# Patient Record
Sex: FEMALE | Race: OTHER | NOT HISPANIC OR LATINO
[De-identification: names, ages, dates, MRNs, and addresses within clinical notes are randomized per-mention and may not be internally consistent; named-entity substitution may affect disease eponyms.]

---

## 2020-01-07 ENCOUNTER — TRANSCRIPTION ENCOUNTER (OUTPATIENT)
Age: 45
End: 2020-01-07

## 2021-08-17 ENCOUNTER — FORM ENCOUNTER (OUTPATIENT)
Age: 46
End: 2021-08-17

## 2021-08-24 ENCOUNTER — TRANSCRIPTION ENCOUNTER (OUTPATIENT)
Age: 46
End: 2021-08-24

## 2021-09-07 ENCOUNTER — TRANSCRIPTION ENCOUNTER (OUTPATIENT)
Age: 46
End: 2021-09-07

## 2021-09-14 ENCOUNTER — TRANSCRIPTION ENCOUNTER (OUTPATIENT)
Age: 46
End: 2021-09-14

## 2021-09-19 ENCOUNTER — TRANSCRIPTION ENCOUNTER (OUTPATIENT)
Age: 46
End: 2021-09-19

## 2021-09-28 ENCOUNTER — TRANSCRIPTION ENCOUNTER (OUTPATIENT)
Age: 46
End: 2021-09-28

## 2021-10-04 ENCOUNTER — TRANSCRIPTION ENCOUNTER (OUTPATIENT)
Age: 46
End: 2021-10-04

## 2021-10-10 ENCOUNTER — TRANSCRIPTION ENCOUNTER (OUTPATIENT)
Age: 46
End: 2021-10-10

## 2021-10-20 ENCOUNTER — TRANSCRIPTION ENCOUNTER (OUTPATIENT)
Age: 46
End: 2021-10-20

## 2021-10-24 ENCOUNTER — TRANSCRIPTION ENCOUNTER (OUTPATIENT)
Age: 46
End: 2021-10-24

## 2021-11-01 ENCOUNTER — TRANSCRIPTION ENCOUNTER (OUTPATIENT)
Age: 46
End: 2021-11-01

## 2021-11-08 ENCOUNTER — TRANSCRIPTION ENCOUNTER (OUTPATIENT)
Age: 46
End: 2021-11-08

## 2021-11-14 ENCOUNTER — TRANSCRIPTION ENCOUNTER (OUTPATIENT)
Age: 46
End: 2021-11-14

## 2021-11-22 ENCOUNTER — TRANSCRIPTION ENCOUNTER (OUTPATIENT)
Age: 46
End: 2021-11-22

## 2021-12-01 ENCOUNTER — TRANSCRIPTION ENCOUNTER (OUTPATIENT)
Age: 46
End: 2021-12-01

## 2021-12-06 ENCOUNTER — TRANSCRIPTION ENCOUNTER (OUTPATIENT)
Age: 46
End: 2021-12-06

## 2021-12-13 ENCOUNTER — TRANSCRIPTION ENCOUNTER (OUTPATIENT)
Age: 46
End: 2021-12-13

## 2021-12-23 ENCOUNTER — TRANSCRIPTION ENCOUNTER (OUTPATIENT)
Age: 46
End: 2021-12-23

## 2022-01-11 ENCOUNTER — TRANSCRIPTION ENCOUNTER (OUTPATIENT)
Age: 47
End: 2022-01-11

## 2022-01-18 ENCOUNTER — TRANSCRIPTION ENCOUNTER (OUTPATIENT)
Age: 47
End: 2022-01-18

## 2022-01-24 ENCOUNTER — TRANSCRIPTION ENCOUNTER (OUTPATIENT)
Age: 47
End: 2022-01-24

## 2022-05-05 ENCOUNTER — NON-APPOINTMENT (OUTPATIENT)
Age: 47
End: 2022-05-05

## 2022-05-06 PROBLEM — Z00.00 ENCOUNTER FOR PREVENTIVE HEALTH EXAMINATION: Status: ACTIVE | Noted: 2022-05-06

## 2022-05-27 ENCOUNTER — APPOINTMENT (OUTPATIENT)
Dept: TRANSPLANT | Facility: CLINIC | Age: 47
End: 2022-05-27
Payer: SUBSIDIZED

## 2022-05-27 ENCOUNTER — APPOINTMENT (OUTPATIENT)
Dept: NEPHROLOGY | Facility: CLINIC | Age: 47
End: 2022-05-27
Payer: SUBSIDIZED

## 2022-05-27 ENCOUNTER — NON-APPOINTMENT (OUTPATIENT)
Age: 47
End: 2022-05-27

## 2022-05-27 VITALS
OXYGEN SATURATION: 100 % | HEIGHT: 64 IN | SYSTOLIC BLOOD PRESSURE: 113 MMHG | WEIGHT: 138 LBS | HEART RATE: 90 BPM | DIASTOLIC BLOOD PRESSURE: 78 MMHG | RESPIRATION RATE: 14 BRPM | BODY MASS INDEX: 23.56 KG/M2

## 2022-05-27 DIAGNOSIS — Z00.5 ENCOUNTER FOR EXAMINATION OF POTENTIAL DONOR OF ORGAN AND TISSUE: ICD-10-CM

## 2022-05-27 PROCEDURE — 99072 ADDL SUPL MATRL&STAF TM PHE: CPT

## 2022-05-27 PROCEDURE — 99205 OFFICE O/P NEW HI 60 MIN: CPT

## 2022-05-27 PROCEDURE — 99203 OFFICE O/P NEW LOW 30 MIN: CPT

## 2022-05-27 NOTE — PLAN
[FreeTextEntry1] : Pt is a good candidate for kidney donation.  She will need her colonoscopy first as she is > 45 and has IBS.  \par In addition will need to check hypercoaguable state as she has family history of DVT and PE.

## 2022-05-27 NOTE — HISTORY OF PRESENT ILLNESS
[Donor denies coercion and/or being paid to donate kidney] : Donor denies coercion and/or being paid to donate kidney [FreeTextEntry3] : 5/27/22 [FreeTextEntry5] : Pt is a 47yo female who would like to donate a kidney to her . \par \par PT denies any medical problems.  She follows w PCP Ant Villanueva for routine care. No h/o hospitalizations. She has a history of IBS and has upcoming colonoscopy in . \par 1 child no gestational DM or HTN.  Had hypercoaguable work up during pregnancy - ? heterozygous.  Mothers family has a history of hypercoaguable state.  \par \par Exercises frequently, kickboxing, no angina. Has herniated discs but do not bother her much. \par \par PMHx: as above\par PSHx: , D&C x 2,\par \par Meds: serphin (ssri), takes NSAIDs during menstrual cycle. \par NKDA\par \par SocHx: Works at [employer] in [place]. Lives in [place]. No tob/social EtOH/no illicits\par FamHx: grandmother w DM, maternal h/o hypercoagulability - had PE's.  Maternal grandmother also with PE.  2 brothers 52 and 50 - healthy.

## 2022-05-27 NOTE — REASON FOR VISIT
Pt. has chronic risk , however currently she is adherent to meds, no sub abuse, she attends groups. she denied any SI or plans. [Initial Evaluation] :  an initial donor evaluation

## 2022-05-27 NOTE — HISTORY OF PRESENT ILLNESS
[Donor denies coercion and/or being paid to donate kidney] : Donor denies coercion and/or being paid to donate kidney [FreeTextEntry3] : 5/27/2022 [FreeTextEntry4] : wife [FreeTextEntry5] : 47yo F presents as potential kidney donor to . \par \par Ms Farmer has no significant medical history.  She follows w PCP Ant Villanueva for routine care.  No h/o hospitalizations.  Does carry diagnosis of IBS and has upcoming colonoscopy.   She denies hematuria, renal calculi, DM, hyperglycemia, HTN. \par \par Exercises frequently, no angina.  Tolerates 4 METs without dyspnea.  \par \par PMHx: as above\par PSHx: , D&C x 2\par \par Meds: serphin\par NKDA\par \par SocHx: Works at [employer] in [place].  Lives in [place].  No tob/social EtOH/no illicits\par FamHx: grandmother w DM, maternal h/o hypercoagulability

## 2022-05-27 NOTE — PHYSICAL EXAM
[Well Developed] : well developed [Clear to Auscultation] : clear to auscultation [Breathing Comfortably on RA] : breathing comfortably on room air [Normal Rate] : normal rate [Soft] : soft [Non-tender] : non-tender [Alert] : alert [Responds to Questions Appropriately] : responds to questions appropriately [Oriented] : oriented [Appropriate] : appropriate

## 2022-05-27 NOTE — PHYSICAL EXAM
[General Appearance - Alert] : alert [General Appearance - In No Acute Distress] : in no acute distress [General Appearance - Well Nourished] : well nourished [General Appearance - Well Developed] : well developed [Sclera] : the sclera and conjunctiva were normal [Extraocular Movements] : extraocular movements were intact [Strabismus] : no strabismus was seen [Outer Ear] : the ears and nose were normal in appearance [Hearing Threshold Finger Rub Not Story] : hearing was normal [Nasal Cavity] : the nasal mucosa and septum were normal [Neck Appearance] : the appearance of the neck was normal [Neck Cervical Mass (___cm)] : no neck mass was observed [Jugular Venous Distention Increased] : there was no jugular-venous distention [Respiration, Rhythm And Depth] : normal respiratory rhythm and effort [Exaggerated Use Of Accessory Muscles For Inspiration] : no accessory muscle use [Auscultation Breath Sounds / Voice Sounds] : lungs were clear to auscultation bilaterally [Heart Rate And Rhythm] : heart rate was normal and rhythm regular [Heart Sounds] : normal S1 and S2 [Heart Sounds Gallop] : no gallops [Murmurs] : no murmurs [Full Pulse] : the pedal pulses are present [Edema] : there was no peripheral edema [Bowel Sounds] : normal bowel sounds [Abdomen Soft] : soft [Abdomen Tenderness] : non-tender [Cervical Lymph Nodes Enlarged Posterior Bilaterally] : posterior cervical [Cervical Lymph Nodes Enlarged Anterior Bilaterally] : anterior cervical [Supraclavicular Lymph Nodes Enlarged Bilaterally] : supraclavicular [Abnormal Walk] : normal gait [Nail Clubbing] : no clubbing  or cyanosis of the fingernails [Musculoskeletal - Swelling] : no joint swelling seen [Skin Color & Pigmentation] : normal skin color and pigmentation [Skin Turgor] : normal skin turgor [] : no rash [Cranial Nerves] : cranial nerves 2-12 were intact [Sensation] : the sensory exam was normal to light touch and pinprick [No Focal Deficits] : no focal deficits [Oriented To Time, Place, And Person] : oriented to person, place, and time [Impaired Insight] : insight and judgment were intact [Affect] : the affect was normal

## 2022-05-27 NOTE — REASON FOR VISIT
[Initial Evaluation] :  an initial donor evaluation range of motion is not limited and there is no muscle tenderness.

## 2022-05-31 ENCOUNTER — NON-APPOINTMENT (OUTPATIENT)
Age: 47
End: 2022-05-31

## 2022-06-07 LAB
ABO + RH PNL BLD: NORMAL
ALBUMIN SERPL ELPH-MCNC: 4.9 G/DL
ALP BLD-CCNC: 131 U/L
ALT SERPL-CCNC: 21 U/L
ANION GAP SERPL CALC-SCNC: 13 MMOL/L
APPEARANCE: CLEAR
APTT BLD: 29.7 SEC
AST SERPL-CCNC: 16 U/L
BACTERIA: NEGATIVE
BASOPHILS # BLD AUTO: 0.03 K/UL
BASOPHILS NFR BLD AUTO: 0.6 %
BILIRUB SERPL-MCNC: 0.2 MG/DL
BILIRUBIN URINE: NEGATIVE
BLOOD URINE: NEGATIVE
BUN SERPL-MCNC: 13 MG/DL
CALCIUM SERPL-MCNC: 9.6 MG/DL
CHLORIDE SERPL-SCNC: 102 MMOL/L
CHOLEST SERPL-MCNC: 198 MG/DL
CMV IGG SERPL QL: <0.2 U/ML
CMV IGG SERPL-IMP: NEGATIVE
CMV IGM SERPL QL: <8 AU/ML
CMV IGM SERPL QL: NEGATIVE
CO2 SERPL-SCNC: 24 MMOL/L
COLOR: NORMAL
COVID-19 NUCLEOCAPSID  GAM ANTIBODY INTERPRETATION: POSITIVE
COVID-19 SPIKE DOMAIN ANTIBODY INTERPRETATION: POSITIVE
CREAT SERPL-MCNC: 0.58 MG/DL
CREAT SPEC-SCNC: 104 MG/DL
CREAT SPEC-SCNC: 104 MG/DL
CREAT/PROT UR: 0.1 RATIO
CYSTATIN C SERPL-MCNC: 0.62 MG/L
EBV EA AB SER IA-ACNC: <5 U/ML
EBV EA AB TITR SER IF: POSITIVE
EBV EA IGG SER QL IA: 559 U/ML
EBV EA IGG SER-ACNC: NEGATIVE
EBV EA IGM SER IA-ACNC: NEGATIVE
EBV PATRN SPEC IB-IMP: NORMAL
EBV VCA IGG SER IA-ACNC: 562 U/ML
EBV VCA IGM SER QL IA: <10 U/ML
EGFR: 112 ML/MIN/1.73M2
EOSINOPHIL # BLD AUTO: 0.11 K/UL
EOSINOPHIL NFR BLD AUTO: 2.1 %
EPSTEIN-BARR VIRUS CAPSID ANTIGEN IGG: POSITIVE
ESTIMATED AVERAGE GLUCOSE: 108 MG/DL
GFR/BSA.PRED SERPLBLD CYS-BASED-ARV: 116 ML/MIN/1.73M2
GLUCOSE QUALITATIVE U: NEGATIVE
GLUCOSE SERPL-MCNC: 85 MG/DL
HBA1C MFR BLD HPLC: 5.4 %
HBV CORE IGG+IGM SER QL: NONREACTIVE
HBV CORE IGM SER QL: NONREACTIVE
HBV E AB SER QL: NONREACTIVE
HBV E AG SER QL: NONREACTIVE
HBV SURFACE AB SER QL: NONREACTIVE
HBV SURFACE AG SER QL: NONREACTIVE
HCG SERPL QL: NEGATIVE
HCT VFR BLD CALC: 40.2 %
HCV AB SER QL: NONREACTIVE
HCV S/CO RATIO: 0.09 S/CO
HDLC SERPL-MCNC: 65 MG/DL
HEPATITIS A IGG ANTIBODY: NONREACTIVE
HGB BLD-MCNC: 12.8 G/DL
HIV1+2 AB SPEC QL IA.RAPID: NONREACTIVE
HSV 1+2 IGG SER IA-IMP: NEGATIVE
HSV 1+2 IGG SER IA-IMP: POSITIVE
HSV1 IGG SER QL: 38.6 INDEX
HSV1 IGM SER QL: NEGATIVE
HSV2 AB FLD-ACNC: NEGATIVE
HSV2 IGG SER QL: 0.09 INDEX
HYALINE CASTS: 1 /LPF
IMM GRANULOCYTES NFR BLD AUTO: 0.2 %
INR PPP: 1.01 RATIO
KETONES URINE: NEGATIVE
LDLC SERPL CALC-MCNC: 121 MG/DL
LEUKOCYTE ESTERASE URINE: NEGATIVE
LYMPHOCYTES # BLD AUTO: 1.5 K/UL
LYMPHOCYTES NFR BLD AUTO: 29 %
MAN DIFF?: NORMAL
MCHC RBC-ENTMCNC: 24.2 PG
MCHC RBC-ENTMCNC: 31.8 GM/DL
MCV RBC AUTO: 76 FL
MEV IGG FLD QL IA: >300 AU/ML
MEV IGG+IGM SER-IMP: POSITIVE
MEV IGM SER QL: 2 ISR
MICROALBUMIN 24H UR DL<=1MG/L-MCNC: <1.2 MG/DL
MICROALBUMIN/CREAT 24H UR-RTO: NORMAL MG/G
MICROSCOPIC-UA: NORMAL
MONOCYTES # BLD AUTO: 0.39 K/UL
MONOCYTES NFR BLD AUTO: 7.5 %
NEUTROPHILS # BLD AUTO: 3.14 K/UL
NEUTROPHILS NFR BLD AUTO: 60.6 %
NITRITE URINE: NEGATIVE
NONHDLC SERPL-MCNC: 133 MG/DL
PAPP-A SERPL-ACNC: <1 MIU/ML
PH URINE: 6
PHOSPHATE SERPL-MCNC: 3.5 MG/DL
PLATELET # BLD AUTO: 258 K/UL
POTASSIUM SERPL-SCNC: 4 MMOL/L
PROT SERPL-MCNC: 7.4 G/DL
PROT UR-MCNC: 8 MG/DL
PROTEIN URINE: NEGATIVE
PT BLD: 11.7 SEC
RBC # BLD: 5.29 M/UL
RBC # FLD: 14.2 %
RED BLOOD CELLS URINE: 2 /HPF
RUBV IGG FLD-ACNC: 1.1 INDEX
RUBV IGG SER-IMP: POSITIVE
SARS-COV-2 AB SERPL IA-ACNC: >250 U/ML
SARS-COV-2 AB SERPL QL IA: 3.85 INDEX
SARS-COV-2 N GENE NPH QL NAA+PROBE: NOT DETECTED
SODIUM SERPL-SCNC: 139 MMOL/L
SPECIFIC GRAVITY URINE: 1.02
SQUAMOUS EPITHELIAL CELLS: 0 /HPF
STRONGYLOIDES AB SER IA-ACNC: NEGATIVE
T GONDII AB SER-IMP: NEGATIVE
T GONDII AB SER-IMP: NEGATIVE
T GONDII IGG SER QL: <3 IU/ML
T GONDII IGM SER QL: <3 AU/ML
T PALLIDUM AB SER QL IA: NEGATIVE
TRIGL SERPL-MCNC: 57 MG/DL
TSH SERPL-ACNC: 0.9 UIU/ML
UROBILINOGEN URINE: NORMAL
VZV AB TITR SER: POSITIVE
VZV IGG SER IF-ACNC: 2913 INDEX
VZV IGM SER IF-ACNC: <0.91 INDEX
WBC # FLD AUTO: 5.18 K/UL
WHITE BLOOD CELLS URINE: 0 /HPF

## 2022-06-20 ENCOUNTER — OUTPATIENT (OUTPATIENT)
Dept: OUTPATIENT SERVICES | Facility: HOSPITAL | Age: 47
LOS: 1 days | End: 2022-06-20
Payer: SUBSIDIZED

## 2022-06-20 ENCOUNTER — APPOINTMENT (OUTPATIENT)
Dept: RADIOLOGY | Facility: CLINIC | Age: 47
End: 2022-06-20
Payer: SUBSIDIZED

## 2022-06-20 ENCOUNTER — APPOINTMENT (OUTPATIENT)
Dept: CT IMAGING | Facility: CLINIC | Age: 47
End: 2022-06-20
Payer: SUBSIDIZED

## 2022-06-20 DIAGNOSIS — Z00.5 ENCOUNTER FOR EXAMINATION OF POTENTIAL DONOR OF ORGAN AND TISSUE: ICD-10-CM

## 2022-06-20 PROCEDURE — 71045 X-RAY EXAM CHEST 1 VIEW: CPT | Mod: 26

## 2022-06-20 PROCEDURE — 74174 CTA ABD&PLVS W/CONTRAST: CPT | Mod: 26

## 2022-06-20 PROCEDURE — 74174 CTA ABD&PLVS W/CONTRAST: CPT

## 2022-06-20 PROCEDURE — 71045 X-RAY EXAM CHEST 1 VIEW: CPT

## 2022-06-21 DIAGNOSIS — L29.9 PRURITUS, UNSPECIFIED: ICD-10-CM

## 2022-08-17 LAB
APCR PPP: 2.73 RATIO
APPEARANCE: CLEAR
APTT BLD: 31.6 SEC
AT III PPP CHRO-ACNC: 98 %
BACTERIA: NEGATIVE
BILIRUBIN URINE: NEGATIVE
BLOOD URINE: NEGATIVE
BSA DERIVED: 1.73 M2
CARDIOLIPIN AB SER IA-ACNC: NEGATIVE
CHOLEST SERPL-MCNC: 171 MG/DL
COLOR: COLORLESS
CONFIRM: 27.9 SEC
CREAT 24H UR-MCNC: 1.1 G/24 H
CREAT ?TM UR-MCNC: 45 MG/DL
CREAT CL 24H UR+SERPL-VRATE: 125 ML/MIN
CREAT SPEC-SCNC: 43 MG/DL
CREAT/PROT UR: 0.1 RATIO
DNA PLOIDY SPEC FC-IMP: NORMAL
DRVVT IMM 1:2 NP PPP: NORMAL
DRVVT SCREEN TO CONFIRM RATIO: 1.02 RATIO
FACT VIII ACT/NOR PPP: 108 %
FIBRINOGEN PPP COAG.DERIVED-MCNC: 387 MG/DL
FVL BLANK: 34.4
FVL TEST: 93.8
GLUCOSE QUALITATIVE U: NEGATIVE
HDLC SERPL-MCNC: 65 MG/DL
HYALINE CASTS: 0 /LPF
INR PPP: 0.98 RATIO
KETONES URINE: NEGATIVE
LDLC SERPL CALC-MCNC: 98 MG/DL
LEUKOCYTE ESTERASE URINE: NEGATIVE
MICROALBUMIN 24H UR DL<=1MG/L-MCNC: <1.2 MG/DL
MICROALBUMIN 24H UR DL<=1MG/L-MRATE: NORMAL MG/24HR
MICROALBUMIN ?TM UR-MRATE: NORMAL UG/MIN
MICROSCOPIC-UA: NORMAL
NITRITE URINE: NEGATIVE
NONHDLC SERPL-MCNC: 106 MG/DL
PH URINE: 7.5
PROT 24H UR-MRATE: 5 MG/DL
PROT ?TM UR-MCNC: 24 HR
PROT S AG ACT/NOR PPP IA: 79 %
PROT UR-MCNC: 120 MG/24 H
PROT UR-MCNC: 5 MG/DL
PROTEIN URINE: NEGATIVE
PT BLD: 11.6 SEC
PTR INTERP: NORMAL
RED BLOOD CELLS URINE: 3 /HPF
SCREEN DRVVT: 33.8 SEC
SILICA CLOTTING TIME INTERPRETATION: NORMAL
SILICA CLOTTING TIME S/C: 0.96 RATIO
SPECIFIC GRAVITY URINE: 1.01
SPECIMEN VOL 24H UR: 2400 ML
SQUAMOUS EPITHELIAL CELLS: 2 /HPF
TRIGL SERPL-MCNC: 44 MG/DL
TT CONT PPP: 23.7 SEC
UROBILINOGEN URINE: NORMAL
WHITE BLOOD CELLS URINE: 0 /HPF

## 2022-10-06 ENCOUNTER — NON-APPOINTMENT (OUTPATIENT)
Age: 47
End: 2022-10-06

## 2022-11-10 ENCOUNTER — NON-APPOINTMENT (OUTPATIENT)
Age: 47
End: 2022-11-10

## 2022-11-16 ENCOUNTER — APPOINTMENT (OUTPATIENT)
Dept: OBGYN | Facility: CLINIC | Age: 47
End: 2022-11-16

## 2022-11-30 ENCOUNTER — APPOINTMENT (OUTPATIENT)
Dept: OBGYN | Facility: CLINIC | Age: 47
End: 2022-11-30

## 2022-11-30 PROCEDURE — 99213 OFFICE O/P EST LOW 20 MIN: CPT

## 2022-11-30 PROCEDURE — 76857 US EXAM PELVIC LIMITED: CPT

## 2022-11-30 PROCEDURE — 76830 TRANSVAGINAL US NON-OB: CPT

## 2022-12-02 NOTE — PROCEDURE
[Pelvic Pain] : pelvic pain [Abnormal Uterine Bleeding] : abnormal uterine bleeding [Transvaginal Ultrasound] : transvaginal ultrasound

## 2022-12-07 ENCOUNTER — NON-APPOINTMENT (OUTPATIENT)
Age: 47
End: 2022-12-07

## 2022-12-07 ENCOUNTER — OUTPATIENT (OUTPATIENT)
Dept: OUTPATIENT SERVICES | Facility: HOSPITAL | Age: 47
LOS: 1 days | End: 2022-12-07
Payer: SUBSIDIZED

## 2022-12-07 ENCOUNTER — APPOINTMENT (OUTPATIENT)
Dept: PSYCHIATRY | Facility: HOSPITAL | Age: 47
End: 2022-12-07

## 2022-12-07 DIAGNOSIS — F41.9 ANXIETY DISORDER, UNSPECIFIED: ICD-10-CM

## 2022-12-07 PROCEDURE — 90792 PSYCH DIAG EVAL W/MED SRVCS: CPT

## 2022-12-23 ENCOUNTER — APPOINTMENT (OUTPATIENT)
Dept: OBGYN | Facility: CLINIC | Age: 47
End: 2022-12-23

## 2022-12-23 VITALS
WEIGHT: 138 LBS | DIASTOLIC BLOOD PRESSURE: 60 MMHG | OXYGEN SATURATION: 98 % | HEIGHT: 64 IN | RESPIRATION RATE: 16 BRPM | SYSTOLIC BLOOD PRESSURE: 120 MMHG | BODY MASS INDEX: 23.56 KG/M2 | HEART RATE: 87 BPM

## 2022-12-23 DIAGNOSIS — B00.1 HERPESVIRAL VESICULAR DERMATITIS: ICD-10-CM

## 2022-12-23 DIAGNOSIS — Z82.49 FAMILY HISTORY OF ISCHEMIC HEART DISEASE AND OTHER DISEASES OF THE CIRCULATORY SYSTEM: ICD-10-CM

## 2022-12-23 LAB
BILIRUB UR QL STRIP: NORMAL
CLARITY UR: CLEAR
COLLECTION METHOD: NORMAL
GLUCOSE UR-MCNC: NORMAL
HCG UR QL: 0.2 EU/DL
HCG UR QL: NEGATIVE
HGB UR QL STRIP.AUTO: NORMAL
KETONES UR-MCNC: NORMAL
LEUKOCYTE ESTERASE UR QL STRIP: NORMAL
NITRITE UR QL STRIP: NORMAL
PH UR STRIP: 5.5
PROT UR STRIP-MCNC: NORMAL
QUALITY CONTROL: YES
SP GR UR STRIP: 1.03

## 2022-12-23 PROCEDURE — 99214 OFFICE O/P EST MOD 30 MIN: CPT

## 2022-12-23 PROCEDURE — 81003 URINALYSIS AUTO W/O SCOPE: CPT | Mod: QW

## 2022-12-23 PROCEDURE — 81025 URINE PREGNANCY TEST: CPT

## 2022-12-23 NOTE — CHIEF COMPLAINT
[FreeTextEntry1] : pt found lump in breast. pt would like breast check.\par The patient also wishes to discuss her fibroid uterus and her recurrent episodes of menorrhagia.

## 2023-01-12 ENCOUNTER — NON-APPOINTMENT (OUTPATIENT)
Age: 48
End: 2023-01-12

## 2023-01-12 ENCOUNTER — APPOINTMENT (OUTPATIENT)
Dept: OTOLARYNGOLOGY | Facility: CLINIC | Age: 48
End: 2023-01-12
Payer: COMMERCIAL

## 2023-01-12 VITALS
HEIGHT: 67 IN | DIASTOLIC BLOOD PRESSURE: 75 MMHG | BODY MASS INDEX: 21.5 KG/M2 | HEART RATE: 64 BPM | SYSTOLIC BLOOD PRESSURE: 110 MMHG | WEIGHT: 137 LBS

## 2023-01-12 DIAGNOSIS — H61.20 IMPACTED CERUMEN, UNSPECIFIED EAR: ICD-10-CM

## 2023-01-12 DIAGNOSIS — R42 DIZZINESS AND GIDDINESS: ICD-10-CM

## 2023-01-12 PROCEDURE — 92567 TYMPANOMETRY: CPT

## 2023-01-12 PROCEDURE — 92557 COMPREHENSIVE HEARING TEST: CPT

## 2023-01-12 PROCEDURE — 99204 OFFICE O/P NEW MOD 45 MIN: CPT

## 2023-01-12 NOTE — DATA REVIEWED
[de-identified] : Type A tymps AU\par Hrg is WNL, 250-8000 Hz, AU\par REC: ENT f/u, re-eval per MD, further testing per MD

## 2023-01-12 NOTE — REVIEW OF SYSTEMS
[Sneezing] : sneezing [Seasonal Allergies] : seasonal allergies [Ear Pain] : ear pain [Hearing Loss] : hearing loss [Vertigo] : vertigo [Nasal Congestion] : nasal congestion [Negative] : Heme/Lymph [Patient Intake Form Reviewed] : Patient intake form was reviewed

## 2023-01-12 NOTE — ASSESSMENT
[FreeTextEntry1] :  wnl\par AVOID Q TIPS \par up to date exam with pmd\par hormone check and anemia discussed\par F/U AFTER ABOVE

## 2023-01-12 NOTE — HISTORY OF PRESENT ILLNESS
[de-identified] : EARS FEEL CLOGGED AT TIMES\par MEDICAL HX REVIEWED\par RECENT TRAVEL TO [place]\par sometime lightheaded

## 2023-01-12 NOTE — PHYSICAL EXAM
[Normal] : mucosa is normal [Midline] : trachea located in midline position [de-identified] : LEFT IMPACTED CERUMEN REMOVED

## 2023-01-23 ENCOUNTER — APPOINTMENT (OUTPATIENT)
Dept: SURGERY | Facility: CLINIC | Age: 48
End: 2023-01-23
Payer: COMMERCIAL

## 2023-01-23 VITALS
HEIGHT: 67 IN | SYSTOLIC BLOOD PRESSURE: 114 MMHG | WEIGHT: 137 LBS | OXYGEN SATURATION: 96 % | TEMPERATURE: 97.6 F | DIASTOLIC BLOOD PRESSURE: 63 MMHG | HEART RATE: 81 BPM | BODY MASS INDEX: 21.5 KG/M2

## 2023-01-23 DIAGNOSIS — Z80.3 FAMILY HISTORY OF MALIGNANT NEOPLASM OF BREAST: ICD-10-CM

## 2023-01-23 DIAGNOSIS — Z78.9 OTHER SPECIFIED HEALTH STATUS: ICD-10-CM

## 2023-01-23 PROCEDURE — 99202 OFFICE O/P NEW SF 15 MIN: CPT

## 2023-01-23 NOTE — PAST MEDICAL HISTORY
[Menarche Age ____] : age at menarche was [unfilled] [Definite ___ (Date)] : the last menstrual period was [unfilled] [Regular Cycle Intervals] : have been regular [Total Preg ___] : G[unfilled] [Live Births ___] : P[unfilled]  [Full Term ___] : Full Term: [unfilled] [Age At Live Birth ___] : Age at live birth: [unfilled] [FreeTextEntry5] : D@C [FreeTextEntry6] : N/A [FreeTextEntry8] : N/A [FreeTextEntry7] : N/A

## 2023-01-23 NOTE — HISTORY OF PRESENT ILLNESS
[FreeTextEntry1] : 47 year old white female with a history of thickened left breast tissue c/o that she thinks it has increased in size. She denies any breast pain or nipple discharge.\par RISK FACTORS\par   FHX multiple aunts with breast cancer\par   1st period 11\par    1st child 38

## 2023-01-23 NOTE — REVIEW OF SYSTEMS
[Eye Pain] : no eye pain [Red Eyes] : eyes not red [Eyesight Problems] : eyesight problems [Discharge From Eyes] : no purulent discharge from the eyes [Eyes Itch] : no itching of the eyes [Dry Eyes] : no dryness of the eyes [Negative] : Heme/Lymph

## 2023-01-23 NOTE — PHYSICAL EXAM
[Normocephalic] : normocephalic [Atraumatic] : atraumatic [EOMI] : extra ocular movement intact [PERRL] : pupils equal, round and reactive to light [Sclera nonicteric] : sclera nonicteric [Supple] : supple [No Supraclavicular Adenopathy] : no supraclavicular adenopathy [No Cervical Adenopathy] : no cervical adenopathy [Clear to Auscultation Bilat] : clear to auscultation bilaterally [Normal Sinus Rhythm] : normal sinus rhythm [Examined in the supine and seated position] : examined in the supine and seated position [Symmetrical] : symmetrical [No dominant masses] : no dominant masses in right breast  [No Nipple Retraction] : no left nipple retraction [No Nipple Discharge] : no left nipple discharge [Breast Mass Right Breast ___cm] : no masses [___cm] : ~M [unfilled] ~Ucm upper outer quadrant mass [No Axillary Lymphadenopathy] : no left axillary lymphadenopathy [de-identified] : mass in the upper outer quadrant [de-identified] : well developed white female in no distress

## 2023-01-25 ENCOUNTER — APPOINTMENT (OUTPATIENT)
Dept: OBGYN | Facility: CLINIC | Age: 48
End: 2023-01-25
Payer: COMMERCIAL

## 2023-01-25 VITALS
SYSTOLIC BLOOD PRESSURE: 90 MMHG | HEART RATE: 81 BPM | HEIGHT: 67 IN | WEIGHT: 137 LBS | OXYGEN SATURATION: 99 % | BODY MASS INDEX: 21.5 KG/M2 | DIASTOLIC BLOOD PRESSURE: 60 MMHG | RESPIRATION RATE: 16 BRPM

## 2023-01-25 LAB
BILIRUB UR QL STRIP: NORMAL
CLARITY UR: CLEAR
COLLECTION METHOD: NORMAL
GLUCOSE UR-MCNC: NORMAL
HCG UR QL: 0.2 EU/DL
HCG UR QL: NEGATIVE
HGB UR QL STRIP.AUTO: NORMAL
KETONES UR-MCNC: NORMAL
LEUKOCYTE ESTERASE UR QL STRIP: NORMAL
NITRITE UR QL STRIP: NORMAL
PH UR STRIP: 5.5
PROT UR STRIP-MCNC: NORMAL
QUALITY CONTROL: YES
SP GR UR STRIP: 1.01

## 2023-01-25 PROCEDURE — 81025 URINE PREGNANCY TEST: CPT

## 2023-01-25 PROCEDURE — 81003 URINALYSIS AUTO W/O SCOPE: CPT | Mod: QW

## 2023-01-25 PROCEDURE — 58558Z: CUSTOM

## 2023-01-25 NOTE — PLAN
[FreeTextEntry1] : An endometrial biopsy was done with 4 passes.\par The office hysteroscopy revealed a normal uterine cavity\par The options of ablation, hysterectomy, or IUD was discussed with the PT as possible solutions. \par \par Risks, benefits and alternatives of IUD contraception were discussed, including risks of infertility, abnormal bleeding, uterine perforation, infection, pregnancy, and ectopic pregnancy.  \par IUD is usually inserted during or just after a period and may be accompanied by pelvic pain and cramps.\par IUD string should be checked by the patient monthly and patient is to return after the first period following insertion for pelvic examination.\par Mirena IUD is effective for 8 years, Kyleena for up to five years, Laurel for three years and ParaGard IUD effective for 10 yrs.\par \par \par \par A consent was read and signed by the PT prior to the procedure.\par \par \par CONSENT TO OPERATION, ANESTHESIA, SEDATION, ANALGESIA, SPECIAL TREATMENT, OR PROCEDURE BLOOD TRANFUSION CONSENT\par Date:________ Time________ AM/PM \par \par \par 1.        Permission: I hereby authorize  _________________ and his/ her associates or assistants of his/ her choice at (the “hospital”) to perform the following procedure(s):\par ________________________________________________________________________________________________________________________________________________________\par \par \par Including such as photographing, videotaping, televising, or other methods of visually recording the procedure(s) as may be purposeful for the advancement of medical knowledge and or education, with the understanding that my (the patient’s) identity will remain anonymous. \par \par \par 2.        Explanation of procedure(s), risk, benefits, and alternatives.\par \par \par Dr. _________________ has fully explained to me the nature and purpose of the procedure(s) and has also informed me of expected benefits and complications (from known causes), attendant discomforts, and the risk that may arise, as well as possible alternative methods of diagnosis and/or treatment to the proposed procedure(s), including no treatment. I have been given an opportunity to ask questions, and all my questions have been answered fully and satisfactorily. \par \par \par 3.        Anesthesia: I further understand that anesthetics, sedatives, or analgesics as may be considered necessary and the type will be explained to me by the anesthesiologist prior to the surgery. \par \par \par 4.        Specimens: Any organs or tissues surgically removed may vbe examined and retained by the hospital medical, scientific, or educational purposes, and such tissues or part may be disposed of in accordance with customary practices and applicable state laws and regulations. \par \par \par 5.        Understanding of this form: I confirm that I have read this form, fully and understand its contents, and that all blank spaces above have been completed prior to my signing. I acknowledge that no guarantees or assurances have been made to me concerning the results intended from the procedure(s) described above. \par \par \par _____________________                _________        _______________________________\par Patient/Agent/Relative/Guardian        Date           Print Name             Relationship\par \par \par _____________________                _________      ________________________________\par Patient/Agent/Relative/Guardian        Date         Print Name              Relationship\par \par \par \par I hereby certify that I have explained the nature, purpose, benefits, and risk of, and alternatives to the operations/procedure(s), have offered to explain any questions, and have fully answered all such questions. I believe that the patient/relative/guardian fully understands what I have explained and answered. In the event that I was not present when the patient signed this form, I understand that the form is only documentation that the informed consent process took place. I remain responsible for having obtained the consent from the patient\par \par \par Date: _________________              Physician:_________________________________\par \par \par                                      Attending Surgeon: _________________________\par

## 2023-01-25 NOTE — PROCEDURE
[Endometrial Biopsy] : Endometrial biopsy [Uterine Perforation] : uterine perforation [Pain] : pain [Negative] : negative pregnancy test [Betadine] : Betadine [None] : none [Specimen Collected] : collected [Anteverted] : anteverted [No Complications] : No complications [Hysteroscopy] : Hysteroscopy [Time out performed] : Pre-procedure time out performed.  Patient's name, date of birth and procedure confirmed. [Consent Obtained] : Consent obtained [Risks] : risks [Benefits] : benefits [Alternatives] : alternatives [Patient] : patient [Infection] : infection [Bleeding] : bleeding [Allergic Reaction] : allergic reaction [Sent to Pathology] : specimen was placed in buffered formalin and sent for pathology [Antibiotics given] : antibiotics given [Hemostasis obtained] : hemostasis obtained [Tolerated Well] : Patient tolerated the procedure well [Aftercare instructions/regstrictions given and follow-up scheduled] : Aftercare instructions/restrictions given and follow-up scheduled [Cytotec] : Cytotec [___ mL Injected] : [unfilled] ~UmL of lidocaine [Ring Forceps] : Ring forceps [Lidocaine___ mL] : [unfilled] ~UmL of lidocaine [flexible] : Using aseptic technique a hysteroscopy was performed using a flexible hysteroscope [de-identified] : fibroid uterus  [de-identified] : Normal uterine cavity, the anterior wall of the uterus was indented by submucous myoma.

## 2023-02-01 ENCOUNTER — NON-APPOINTMENT (OUTPATIENT)
Age: 48
End: 2023-02-01

## 2023-02-01 LAB — CORE LAB BIOPSY: NORMAL

## 2023-02-16 ENCOUNTER — APPOINTMENT (OUTPATIENT)
Dept: SURGERY | Facility: CLINIC | Age: 48
End: 2023-02-16
Payer: COMMERCIAL

## 2023-02-16 VITALS — TEMPERATURE: 97.6 F

## 2023-02-16 DIAGNOSIS — N63.21 UNSPECIFIED LUMP IN THE LEFT BREAST, UPPER OUTER QUADRANT: ICD-10-CM

## 2023-02-16 PROCEDURE — 99212 OFFICE O/P EST SF 10 MIN: CPT

## 2023-02-16 NOTE — PHYSICAL EXAM
[Normocephalic] : normocephalic [Atraumatic] : atraumatic [EOMI] : extra ocular movement intact [PERRL] : pupils equal, round and reactive to light [Sclera nonicteric] : sclera nonicteric [Supple] : supple [No Supraclavicular Adenopathy] : no supraclavicular adenopathy [No Cervical Adenopathy] : no cervical adenopathy [Clear to Auscultation Bilat] : clear to auscultation bilaterally [Normal Sinus Rhythm] : normal sinus rhythm [Examined in the supine and seated position] : examined in the supine and seated position [No dominant masses] : no dominant masses in right breast  [No Nipple Retraction] : no left nipple retraction [No Nipple Discharge] : no left nipple discharge [No Axillary Lymphadenopathy] : no left axillary lymphadenopathy

## 2023-02-16 NOTE — HISTORY OF PRESENT ILLNESS
[FreeTextEntry1] : 47 year old white female with a history of thickened left breast tissue c/o that she thinks it has increased in size. She denies any breast pain or nipple discharge.\par RISK FACTORS\par   FHX multiple aunts with breast cancer\par   1st period 11\par    1st child 38 \par 2/16/23 pt returns after her repeat mammo and sonogram, Denies any further changes.

## 2023-02-27 ENCOUNTER — NON-APPOINTMENT (OUTPATIENT)
Age: 48
End: 2023-02-27

## 2023-02-27 ENCOUNTER — RX RENEWAL (OUTPATIENT)
Age: 48
End: 2023-02-27

## 2023-06-01 ENCOUNTER — NON-APPOINTMENT (OUTPATIENT)
Age: 48
End: 2023-06-01

## 2023-06-01 DIAGNOSIS — D21.9 BENIGN NEOPLASM OF CONNECTIVE AND OTHER SOFT TISSUE, UNSPECIFIED: ICD-10-CM

## 2023-06-10 ENCOUNTER — APPOINTMENT (OUTPATIENT)
Dept: MRI IMAGING | Facility: CLINIC | Age: 48
End: 2023-06-10
Payer: COMMERCIAL

## 2023-06-10 ENCOUNTER — OUTPATIENT (OUTPATIENT)
Dept: OUTPATIENT SERVICES | Facility: HOSPITAL | Age: 48
LOS: 1 days | End: 2023-06-10
Payer: COMMERCIAL

## 2023-06-10 DIAGNOSIS — Z00.8 ENCOUNTER FOR OTHER GENERAL EXAMINATION: ICD-10-CM

## 2023-06-10 PROCEDURE — A9585: CPT

## 2023-06-10 PROCEDURE — 72197 MRI PELVIS W/O & W/DYE: CPT

## 2023-06-10 PROCEDURE — 72197 MRI PELVIS W/O & W/DYE: CPT | Mod: 26

## 2023-07-18 ENCOUNTER — NON-APPOINTMENT (OUTPATIENT)
Age: 48
End: 2023-07-18

## 2023-07-18 ENCOUNTER — APPOINTMENT (OUTPATIENT)
Dept: INTERVENTIONAL RADIOLOGY/VASCULAR | Facility: CLINIC | Age: 48
End: 2023-07-18
Payer: COMMERCIAL

## 2023-08-29 NOTE — HISTORY OF PRESENT ILLNESS
[FreeTextEntry1] : 48 years old female with hx of symptomatic uterine fibroids. Symptoms have been persistent for a while but recently got worse. Symptoms include: \par  Endometrial biopsy     - Secretory endometrium. - Endometrial polyp 01/25/23. \par \par Gyn\par \par Denies any recent SOB, CP, fever, chills, n/v/d.????\par \par

## 2023-08-31 ENCOUNTER — APPOINTMENT (OUTPATIENT)
Dept: INTERVENTIONAL RADIOLOGY/VASCULAR | Facility: CLINIC | Age: 48
End: 2023-08-31
Payer: COMMERCIAL

## 2023-08-31 VITALS — WEIGHT: 130 LBS | BODY MASS INDEX: 20.4 KG/M2 | HEIGHT: 67 IN

## 2023-08-31 DIAGNOSIS — Z78.9 OTHER SPECIFIED HEALTH STATUS: ICD-10-CM

## 2023-08-31 PROCEDURE — 99203 OFFICE O/P NEW LOW 30 MIN: CPT | Mod: 95

## 2023-08-31 RX ORDER — MISOPROSTOL 200 UG/1
200 TABLET ORAL
Qty: 2 | Refills: 0 | Status: COMPLETED | COMMUNITY
Start: 2023-01-11 | End: 2023-08-31

## 2023-08-31 RX ORDER — ACYCLOVIR 50 MG/G
5 CREAM TOPICAL
Qty: 1 | Refills: 0 | Status: COMPLETED | COMMUNITY
Start: 2022-12-23 | End: 2023-08-31

## 2023-08-31 RX ORDER — TRANEXAMIC ACID 650 MG/1
TABLET ORAL
Refills: 0 | Status: COMPLETED | COMMUNITY
End: 2023-08-31

## 2023-08-31 RX ORDER — HYDROXYZINE HYDROCHLORIDE 10 MG/1
10 TABLET ORAL AS DIRECTED
Qty: 30 | Refills: 0 | Status: COMPLETED | COMMUNITY
Start: 2022-06-21 | End: 2023-08-31

## 2023-08-31 RX ORDER — VALACYCLOVIR 1 G/1
1 TABLET, FILM COATED ORAL
Qty: 4 | Refills: 3 | Status: COMPLETED | COMMUNITY
Start: 2022-12-23 | End: 2023-08-31

## 2023-08-31 NOTE — REVIEW OF SYSTEMS
[Fever] : no fever [Chills] : no chills [Nosebleeds] : no nosebleeds [Sore Throat] : no sore throat [Hoarseness] : no hoarseness [Chest Pain] : no chest pain [Palpitations] : no palpitations [Shortness Of Breath] : no shortness of breath [Wheezing] : no wheezing [Cough] : no cough [Abdominal Pain] : no abdominal pain [Vomiting] : no vomiting [Constipation] : no constipation [Diarrhea] : no diarrhea [Easy Bleeding] : no tendency for easy bleeding [Easy Bruising] : no tendency for easy bruising

## 2023-08-31 NOTE — CONSULT LETTER
[Dear  ___] : Dear  [unfilled], [Consult Letter:] : I had the pleasure of evaluating your patient, [unfilled]. [Please see my note below.] : Please see my note below. [Consult Closing:] : Thank you very much for allowing me to participate in the care of this patient.  If you have any questions, please do not hesitate to contact me. [Sincerely,] : Sincerely, [FreeTextEntry2] : Dr. Abdalla [FreeTextEntry3] : Beau Tian MD, FSIR, FACR Interventional Radiologist   Executive  Department of Radiology- [facility]    Associate Professor of Radiology [employer] at [facility] [employer] [facility]

## 2023-08-31 NOTE — HISTORY OF PRESENT ILLNESS
[Menorrhagia] : ~T menorrhagia [Bloating] : bloating [Last Menstrual Period (___)] : Last menstrual period [unfilled] [Monthly Cycles Regular] : monthly cycles are regular [G ___] : G [unfilled] [P___] : P [unfilled] [M___] : M [unfilled] [A___] : A [unfilled] [FreeTextEntry1] : Patient is a 47 years old female with hx of symptomatic uterine fibroids. Symptoms have been persistent for a while but recently got worse. Symptoms include:  heavy bleeding,   Endometrial biopsy - Secretory endometrium. - Endometrial polyp 01/25/23. Gyn: Dr. Abdalla  Denies any recent SOB, CP, fever, chills, n/v/d.  [Plans for future pregnancies within 2 years] : does not plan to have future pregnancies within 2 years

## 2023-08-31 NOTE — REASON FOR VISIT
[Consultation] : a consultation visit [Home] : at home, [unfilled] , at the time of the visit. [Medical Office: ([facility])___] : at the medical office located in  [Patient] : the patient [Self] : self [FreeTextEntry1] : YUNIOR

## 2023-08-31 NOTE — PHYSICAL EXAM
[Alert] : alert [Well Nourished] : well nourished [Oriented x3] : oriented to person, place, and time [Normal Affect] : the affect was normal [Fully active, able to carry on all pre-disease performance without restriction] : Fully active, able to carry on all pre-disease performance without restriction

## 2023-08-31 NOTE — ASSESSMENT
[FreeTextEntry1] : This patient has adenomyosis which is the most likely etiology for her bleeding as she has no fibroids identified.. As you probably know, adenomyoticc tissue rarely infarcts following embolization and while bleeding often improves due to the diminished blood supply to the uterus, at several months to a year it often recurs as significantly as preprocedure.  I explained this to the patient and spoke with her about the fact that we generally only embolize adenomyosis to bridge people to menopause or through pregnancy and given her age, it is likely she will recur.  I encouraged her to meet with her gynecologist as the fact that she had adenomyosis rather than fibroids was not evident previously.  I did explain that I would be willing to do the procedure understanding that she is not unlikely to recur if she does not enter menopause in the next 2 years or so.  She stated she would likely hold off but will speak to her gynecologist and I furnished her with my contact information should she wish to discuss proceeding with the procedure.

## 2023-08-31 NOTE — DATA REVIEWED
[FreeTextEntry1] : MR pelvis reviewed with patient. All questions answered during consultation.  Mild adenomyosis without fibroids identified.

## 2023-10-13 ENCOUNTER — APPOINTMENT (OUTPATIENT)
Dept: OBGYN | Facility: CLINIC | Age: 48
End: 2023-10-13
Payer: COMMERCIAL

## 2023-10-13 DIAGNOSIS — N92.0 EXCESSIVE AND FREQUENT MENSTRUATION WITH REGULAR CYCLE: ICD-10-CM

## 2023-10-13 PROCEDURE — 76857 US EXAM PELVIC LIMITED: CPT

## 2023-10-13 PROCEDURE — 76830 TRANSVAGINAL US NON-OB: CPT

## 2023-10-16 PROBLEM — N92.0 MENORRHAGIA: Status: ACTIVE | Noted: 2022-11-14

## 2023-10-24 ENCOUNTER — APPOINTMENT (OUTPATIENT)
Dept: OBGYN | Facility: CLINIC | Age: 48
End: 2023-10-24
Payer: COMMERCIAL

## 2023-10-24 VITALS
HEIGHT: 67 IN | BODY MASS INDEX: 23.07 KG/M2 | SYSTOLIC BLOOD PRESSURE: 100 MMHG | DIASTOLIC BLOOD PRESSURE: 68 MMHG | OXYGEN SATURATION: 98 % | HEART RATE: 81 BPM | WEIGHT: 147 LBS

## 2023-10-24 DIAGNOSIS — Z12.39 ENCOUNTER FOR OTHER SCREENING FOR MALIGNANT NEOPLASM OF BREAST: ICD-10-CM

## 2023-10-24 DIAGNOSIS — Z12.4 ENCOUNTER FOR SCREENING FOR MALIGNANT NEOPLASM OF CERVIX: ICD-10-CM

## 2023-10-24 DIAGNOSIS — N80.03 ADENOMYOSIS OF THE UTERUS: ICD-10-CM

## 2023-10-24 DIAGNOSIS — Z11.3 ENCOUNTER FOR SCREENING FOR INFECTIONS WITH A PREDOMINANTLY SEXUAL MODE OF TRANSMISSION: ICD-10-CM

## 2023-10-24 DIAGNOSIS — D21.9 BENIGN NEOPLASM OF CONNECTIVE AND OTHER SOFT TISSUE, UNSPECIFIED: ICD-10-CM

## 2023-10-24 DIAGNOSIS — N64.4 MASTODYNIA: ICD-10-CM

## 2023-10-24 DIAGNOSIS — Z13.31 ENCOUNTER FOR SCREENING FOR DEPRESSION: ICD-10-CM

## 2023-10-24 DIAGNOSIS — N94.3 PREMENSTRUAL TENSION SYNDROME: ICD-10-CM

## 2023-10-24 DIAGNOSIS — Z01.411 ENCOUNTER FOR GYNECOLOGICAL EXAMINATION (GENERAL) (ROUTINE) WITH ABNORMAL FINDINGS: ICD-10-CM

## 2023-10-24 DIAGNOSIS — R14.0 ABDOMINAL DISTENSION (GASEOUS): ICD-10-CM

## 2023-10-24 DIAGNOSIS — Z01.419 ENCOUNTER FOR GYNECOLOGICAL EXAMINATION (GENERAL) (ROUTINE) W/OUT ABNORMAL FINDINGS: ICD-10-CM

## 2023-10-24 LAB
BILIRUB UR QL STRIP: NORMAL
CLARITY UR: CLEAR
COLLECTION METHOD: NORMAL
GLUCOSE UR-MCNC: NORMAL
HCG UR QL: 0.2 EU/DL
HCG UR QL: NEGATIVE
HGB UR QL STRIP.AUTO: NORMAL
KETONES UR-MCNC: NORMAL
LEUKOCYTE ESTERASE UR QL STRIP: NORMAL
NITRITE UR QL STRIP: NORMAL
PH UR STRIP: 6
PROT UR STRIP-MCNC: NORMAL
QUALITY CONTROL: YES
SP GR UR STRIP: 1.01

## 2023-10-24 PROCEDURE — 99396 PREV VISIT EST AGE 40-64: CPT

## 2023-10-24 PROCEDURE — 81003 URINALYSIS AUTO W/O SCOPE: CPT | Mod: QW

## 2023-10-24 PROCEDURE — 81025 URINE PREGNANCY TEST: CPT

## 2023-10-24 PROCEDURE — 82270 OCCULT BLOOD FECES: CPT

## 2023-10-24 RX ORDER — MAGNESIUM OXIDE/MAG AA CHELATE 300 MG
CAPSULE ORAL
Refills: 0 | Status: ACTIVE | COMMUNITY

## 2023-10-24 RX ORDER — FUROSEMIDE 20 MG/1
20 TABLET ORAL
Qty: 20 | Refills: 0 | Status: ACTIVE | COMMUNITY
Start: 2023-10-24 | End: 1900-01-01

## 2023-10-24 RX ORDER — SAW/PYGEUM/BETA/HERB/D3/B6/ZN 30 MG-25MG
CAPSULE ORAL
Refills: 0 | Status: ACTIVE | COMMUNITY

## 2023-10-24 RX ORDER — GABAPENTIN 300 MG/1
300 CAPSULE ORAL
Qty: 90 | Refills: 1 | Status: ACTIVE | COMMUNITY
Start: 2023-10-24 | End: 1900-01-01

## 2023-10-24 RX ORDER — MULTIVITAMIN
TABLET ORAL
Refills: 0 | Status: ACTIVE | COMMUNITY

## 2023-10-24 RX ORDER — ACETAMINOPHEN 325 MG
TABLET ORAL
Refills: 0 | Status: ACTIVE | COMMUNITY

## 2023-10-24 RX ORDER — LUTEIN 6 MG
TABLET ORAL
Refills: 0 | Status: ACTIVE | COMMUNITY

## 2023-10-25 LAB
SOURCE AMPLIFICATION: NORMAL
T VAGINALIS RRNA SPEC QL NAA+PROBE: NOT DETECTED

## 2023-10-26 LAB
HPV 16 E6+E7 MRNA CVX QL NAA+PROBE: NOT DETECTED
HPV18+45 E6+E7 MRNA CVX QL NAA+PROBE: NOT DETECTED

## 2023-10-30 LAB — CYTOLOGY CVX/VAG DOC THIN PREP: NORMAL

## 2024-01-26 RX ORDER — TRANEXAMIC ACID 650 MG/1
650 TABLET ORAL
Qty: 30 | Refills: 1 | Status: ACTIVE | COMMUNITY
Start: 2022-12-23 | End: 1900-01-01

## 2024-07-31 ENCOUNTER — APPOINTMENT (OUTPATIENT)
Dept: OBGYN | Facility: CLINIC | Age: 49
End: 2024-07-31
Payer: COMMERCIAL

## 2024-07-31 DIAGNOSIS — N80.03 ADENOMYOSIS OF THE UTERUS: ICD-10-CM

## 2024-07-31 DIAGNOSIS — R14.0 ABDOMINAL DISTENSION (GASEOUS): ICD-10-CM

## 2024-07-31 DIAGNOSIS — D21.9 BENIGN NEOPLASM OF CONNECTIVE AND OTHER SOFT TISSUE, UNSPECIFIED: ICD-10-CM

## 2024-07-31 PROCEDURE — 76857 US EXAM PELVIC LIMITED: CPT

## 2024-07-31 PROCEDURE — 76830 TRANSVAGINAL US NON-OB: CPT

## 2024-09-03 ENCOUNTER — NON-APPOINTMENT (OUTPATIENT)
Age: 49
End: 2024-09-03

## 2024-09-04 ENCOUNTER — APPOINTMENT (OUTPATIENT)
Dept: OTOLARYNGOLOGY | Facility: CLINIC | Age: 49
End: 2024-09-04
Payer: COMMERCIAL

## 2024-09-04 VITALS
DIASTOLIC BLOOD PRESSURE: 68 MMHG | HEART RATE: 86 BPM | WEIGHT: 140 LBS | HEIGHT: 67 IN | BODY MASS INDEX: 21.97 KG/M2 | SYSTOLIC BLOOD PRESSURE: 127 MMHG

## 2024-09-04 DIAGNOSIS — R42 DIZZINESS AND GIDDINESS: ICD-10-CM

## 2024-09-04 DIAGNOSIS — H61.20 IMPACTED CERUMEN, UNSPECIFIED EAR: ICD-10-CM

## 2024-09-04 PROCEDURE — 99213 OFFICE O/P EST LOW 20 MIN: CPT | Mod: 25

## 2024-09-04 PROCEDURE — G0268 REMOVAL OF IMPACTED WAX MD: CPT

## 2024-09-04 PROCEDURE — 92557 COMPREHENSIVE HEARING TEST: CPT

## 2024-09-04 PROCEDURE — 92567 TYMPANOMETRY: CPT

## 2024-09-04 NOTE — HISTORY OF PRESENT ILLNESS
[de-identified] : DIZZINESS WITH NAUSEA AND VOMITTING WHILE VISITING [place] 2 WEEKS AGO MIGRAINE HEADACHE WITH VOMITTING ED VISIT DONE/ HYDRATION HELPED MEDICAL HX REVIEWED

## 2024-09-04 NOTE — PHYSICAL EXAM
[Normal] : mucosa is normal [Midline] : trachea located in midline position [de-identified] : CIERA IMPACTED CERUMEN REMOVED/ HEARING IMPROVED/ DRY SKIN CANAL

## 2024-09-04 NOTE — ASSESSMENT
[FreeTextEntry1] : SKINNY WNL MIGRAINOUS VERTIGO DISCUSSED PMD UP TO DATE EXAM SYMPS LIKELY NON OTOGENIC MECLIZINE PN NO DRIVING WHEN TAKING MECLIZIN OR SERC WHICH SHE HAS FROM [place]

## 2024-10-25 DIAGNOSIS — Z12.39 ENCOUNTER FOR OTHER SCREENING FOR MALIGNANT NEOPLASM OF BREAST: ICD-10-CM

## 2024-10-25 DIAGNOSIS — Z13.31 ENCOUNTER FOR SCREENING FOR DEPRESSION: ICD-10-CM

## 2024-10-25 DIAGNOSIS — Z11.3 ENCOUNTER FOR SCREENING FOR INFECTIONS WITH A PREDOMINANTLY SEXUAL MODE OF TRANSMISSION: ICD-10-CM

## 2024-10-28 ENCOUNTER — APPOINTMENT (OUTPATIENT)
Dept: OBGYN | Facility: CLINIC | Age: 49
End: 2024-10-28
Payer: COMMERCIAL

## 2024-10-28 ENCOUNTER — LABORATORY RESULT (OUTPATIENT)
Age: 49
End: 2024-10-28

## 2024-10-28 VITALS
HEIGHT: 67 IN | SYSTOLIC BLOOD PRESSURE: 122 MMHG | HEART RATE: 92 BPM | BODY MASS INDEX: 21.97 KG/M2 | RESPIRATION RATE: 14 BRPM | DIASTOLIC BLOOD PRESSURE: 70 MMHG | OXYGEN SATURATION: 98 % | WEIGHT: 140 LBS

## 2024-10-28 DIAGNOSIS — Z01.419 ENCOUNTER FOR GYNECOLOGICAL EXAMINATION (GENERAL) (ROUTINE) W/OUT ABNORMAL FINDINGS: ICD-10-CM

## 2024-10-28 DIAGNOSIS — Z86.018 PERSONAL HISTORY OF OTHER BENIGN NEOPLASM: ICD-10-CM

## 2024-10-28 DIAGNOSIS — D21.9 BENIGN NEOPLASM OF CONNECTIVE AND OTHER SOFT TISSUE, UNSPECIFIED: ICD-10-CM

## 2024-10-28 DIAGNOSIS — Z80.3 FAMILY HISTORY OF MALIGNANT NEOPLASM OF BREAST: ICD-10-CM

## 2024-10-28 DIAGNOSIS — Z12.4 ENCOUNTER FOR SCREENING FOR MALIGNANT NEOPLASM OF CERVIX: ICD-10-CM

## 2024-10-28 DIAGNOSIS — G43.909 MIGRAINE, UNSPECIFIED, NOT INTRACTABLE, W/OUT STATUS MIGRAINOSUS: ICD-10-CM

## 2024-10-28 DIAGNOSIS — Z12.11 ENCOUNTER FOR SCREENING FOR MALIGNANT NEOPLASM OF COLON: ICD-10-CM

## 2024-10-28 DIAGNOSIS — N80.03 ADENOMYOSIS OF THE UTERUS: ICD-10-CM

## 2024-10-28 DIAGNOSIS — R92.30 DENSE BREASTS, UNSPECIFIED: ICD-10-CM

## 2024-10-28 DIAGNOSIS — Z12.39 ENCOUNTER FOR OTHER SCREENING FOR MALIGNANT NEOPLASM OF BREAST: ICD-10-CM

## 2024-10-28 PROCEDURE — 82270 OCCULT BLOOD FECES: CPT

## 2024-10-28 PROCEDURE — 99396 PREV VISIT EST AGE 40-64: CPT

## 2024-10-28 PROCEDURE — 99459 PELVIC EXAMINATION: CPT

## 2024-11-01 LAB — CYTOLOGY CVX/VAG DOC THIN PREP: NORMAL

## 2025-01-02 ENCOUNTER — APPOINTMENT (OUTPATIENT)
Dept: OTOLARYNGOLOGY | Facility: CLINIC | Age: 50
End: 2025-01-02
Payer: COMMERCIAL

## 2025-01-02 VITALS
HEIGHT: 67 IN | DIASTOLIC BLOOD PRESSURE: 67 MMHG | HEART RATE: 84 BPM | SYSTOLIC BLOOD PRESSURE: 106 MMHG | BODY MASS INDEX: 21.66 KG/M2 | WEIGHT: 138 LBS

## 2025-01-02 DIAGNOSIS — H61.20 IMPACTED CERUMEN, UNSPECIFIED EAR: ICD-10-CM

## 2025-01-02 DIAGNOSIS — R07.0 PAIN IN THROAT: ICD-10-CM

## 2025-01-02 DIAGNOSIS — G43.909 MIGRAINE, UNSPECIFIED, NOT INTRACTABLE, W/OUT STATUS MIGRAINOSUS: ICD-10-CM

## 2025-01-02 PROCEDURE — 31575 DIAGNOSTIC LARYNGOSCOPY: CPT

## 2025-01-02 PROCEDURE — 69210 REMOVE IMPACTED EAR WAX UNI: CPT

## 2025-01-02 PROCEDURE — 99213 OFFICE O/P EST LOW 20 MIN: CPT | Mod: 25

## 2025-05-12 ENCOUNTER — APPOINTMENT (OUTPATIENT)
Dept: CARDIOLOGY | Facility: CLINIC | Age: 50
End: 2025-05-12
Payer: COMMERCIAL

## 2025-05-12 ENCOUNTER — NON-APPOINTMENT (OUTPATIENT)
Age: 50
End: 2025-05-12

## 2025-05-12 VITALS — HEART RATE: 83 BPM | DIASTOLIC BLOOD PRESSURE: 72 MMHG | SYSTOLIC BLOOD PRESSURE: 120 MMHG | OXYGEN SATURATION: 99 %

## 2025-05-12 DIAGNOSIS — R55 SYNCOPE AND COLLAPSE: ICD-10-CM

## 2025-05-12 DIAGNOSIS — G44.009 CLUSTER HEADACHE SYNDROME, UNSPECIFIED, NOT INTRACTABLE: ICD-10-CM

## 2025-05-12 DIAGNOSIS — N95.1 MENOPAUSAL AND FEMALE CLIMACTERIC STATES: ICD-10-CM

## 2025-05-12 DIAGNOSIS — R42 DIZZINESS AND GIDDINESS: ICD-10-CM

## 2025-05-12 PROCEDURE — G2211 COMPLEX E/M VISIT ADD ON: CPT | Mod: NC

## 2025-05-12 PROCEDURE — 93000 ELECTROCARDIOGRAM COMPLETE: CPT

## 2025-05-12 PROCEDURE — 99204 OFFICE O/P NEW MOD 45 MIN: CPT

## 2025-05-19 ENCOUNTER — APPOINTMENT (OUTPATIENT)
Dept: CARDIOLOGY | Facility: CLINIC | Age: 50
End: 2025-05-19

## 2025-05-22 ENCOUNTER — APPOINTMENT (OUTPATIENT)
Dept: CARDIOLOGY | Facility: CLINIC | Age: 50
End: 2025-05-22

## 2025-06-13 ENCOUNTER — NON-APPOINTMENT (OUTPATIENT)
Age: 50
End: 2025-06-13

## 2025-06-13 ENCOUNTER — APPOINTMENT (OUTPATIENT)
Dept: OTOLARYNGOLOGY | Facility: CLINIC | Age: 50
End: 2025-06-13
Payer: COMMERCIAL

## 2025-06-13 VITALS
WEIGHT: 140 LBS | HEIGHT: 67 IN | HEART RATE: 67 BPM | BODY MASS INDEX: 21.97 KG/M2 | DIASTOLIC BLOOD PRESSURE: 85 MMHG | SYSTOLIC BLOOD PRESSURE: 125 MMHG

## 2025-06-13 PROBLEM — H69.90 EUSTACHIAN TUBE DYSFUNCTION: Status: ACTIVE | Noted: 2025-06-13

## 2025-06-13 PROBLEM — J31.0 RHINITIS, UNSPECIFIED TYPE: Status: ACTIVE | Noted: 2025-06-13

## 2025-06-13 PROCEDURE — 92567 TYMPANOMETRY: CPT

## 2025-06-13 PROCEDURE — 99213 OFFICE O/P EST LOW 20 MIN: CPT | Mod: 25

## 2025-06-13 PROCEDURE — G0268 REMOVAL OF IMPACTED WAX MD: CPT

## 2025-06-25 ENCOUNTER — APPOINTMENT (OUTPATIENT)
Dept: OBGYN | Facility: CLINIC | Age: 50
End: 2025-06-25

## 2025-08-04 ENCOUNTER — APPOINTMENT (OUTPATIENT)
Dept: OBGYN | Facility: CLINIC | Age: 50
End: 2025-08-04